# Patient Record
Sex: MALE | Race: WHITE | URBAN - METROPOLITAN AREA
[De-identification: names, ages, dates, MRNs, and addresses within clinical notes are randomized per-mention and may not be internally consistent; named-entity substitution may affect disease eponyms.]

---

## 2021-10-08 ENCOUNTER — OFFICE VISIT (OUTPATIENT)
Dept: URBAN - METROPOLITAN AREA CLINIC 33 | Facility: CLINIC | Age: 71
End: 2021-10-08
Payer: MEDICARE

## 2021-10-08 DIAGNOSIS — H18.832 RECURRENT EROSION OF CORNEA, LEFT EYE: Primary | ICD-10-CM

## 2021-10-08 DIAGNOSIS — Z96.1 PRESENCE OF INTRAOCULAR LENS: ICD-10-CM

## 2021-10-08 PROCEDURE — 99203 OFFICE O/P NEW LOW 30 MIN: CPT | Performed by: OPTOMETRIST

## 2021-10-08 ASSESSMENT — INTRAOCULAR PRESSURE
OS: 10
OD: 10

## 2021-10-08 ASSESSMENT — KERATOMETRY
OD: 44.50
OS: 45.00

## 2021-10-08 NOTE — IMPRESSION/PLAN
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 Plan: Discussed condition with patient. Patient reports erosions have been occurring since 3 years ago. Recommend patient starts Juan [address] moshe at bedtime and artificial tear QID OU. Discussed BCL, topical steroid, or antibiotic if condition does not improve with planned treatment.